# Patient Record
Sex: FEMALE | Race: ASIAN | NOT HISPANIC OR LATINO | URBAN - METROPOLITAN AREA
[De-identification: names, ages, dates, MRNs, and addresses within clinical notes are randomized per-mention and may not be internally consistent; named-entity substitution may affect disease eponyms.]

---

## 2021-10-08 ENCOUNTER — OFFICE VISIT (OUTPATIENT)
Dept: URBAN - METROPOLITAN AREA CLINIC 33 | Facility: CLINIC | Age: 46
End: 2021-10-08

## 2021-10-08 VITALS
HEIGHT: 63 IN | SYSTOLIC BLOOD PRESSURE: 104 MMHG | OXYGEN SATURATION: 97 % | WEIGHT: 110 LBS | HEART RATE: 64 BPM | BODY MASS INDEX: 19.49 KG/M2 | DIASTOLIC BLOOD PRESSURE: 64 MMHG

## 2021-10-08 PROBLEM — 267103008 GLOBUS SENSATION: Status: ACTIVE | Noted: 2021-10-08

## 2021-10-08 PROBLEM — 235595009 GASTROESOPHAGEAL REFLUX DISEASE: Status: ACTIVE | Noted: 2021-10-08

## 2021-10-08 RX ORDER — PANTOPRAZOLE SODIUM 40 MG/1
1 TABLET TABLET, DELAYED RELEASE ORAL ONCE A DAY
Qty: 30 | Status: ACTIVE | COMMUNITY

## 2021-10-08 RX ORDER — SODIUM, POTASSIUM,MAG SULFATES 17.5-3.13G
ML AS DIRECTED SOLUTION, RECONSTITUTED, ORAL ORAL
Qty: 1 KIT | Refills: 0 | OUTPATIENT
Start: 2021-10-08

## 2021-10-08 NOTE — HPI-MIGRATED HPI
;   ;     Dysphagia : 45 y/o female patient presents today for consultation about dysphagia. Onset was 4 months and episodes occur every day. Dysphagia occurs with solid foods. Patient admits having the sensation of food getting stuck in the throat or chest or behind the breastbone (sternum), but denies bringing food back up (regurgitation). Patient admits heartburn and sore throat, but denies associated symptoms such as odynophagia, vomiting, or fever.  Patient admits she has tried Nexium without relief of symptoms and is currently not taking this medication.   Patient admits to taking Protonix 40mg in the AM and Famotidine 20mg in the PM for relief of symptoms.  Patient denies past EGD, Pill Cam Study, or Barium Swallow Test. ;   Acid Reflux : 45 y/o female patient admits the recent onset of acid reflux. Onset was 4 months and episodes occur every day. The symptoms are described as burning sensation and is located in the epigastric region. Patient states that she has tried Nexium with no relief of symptoms and is not currently taking any medication. Patient was prescribed Protonix 40 MG by her PCP. Patient admits dysphagia and sour erucatations, but denies excessive belching, globus, bloating/gas, indigestion, early satiety, changes in appetite, coughing, abdominal/epigastric pain, or changes in bowel habits. Patient states that they have not had any recent changes in their medications. Patient states that she's been controlling her diet and avoiding caffeine, but did not see any alleviation in her symptoms.   Patient denies family hx of gastric/esophageal cancer or diseases. Patient denies past EGD. ;

## 2021-10-28 ENCOUNTER — OFFICE VISIT (OUTPATIENT)
Dept: URBAN - METROPOLITAN AREA SURGERY CENTER 8 | Facility: SURGERY CENTER | Age: 46
End: 2021-10-28

## 2021-11-11 ENCOUNTER — OFFICE VISIT (OUTPATIENT)
Dept: URBAN - METROPOLITAN AREA CLINIC 35 | Facility: CLINIC | Age: 46
End: 2021-11-11

## 2021-11-11 ENCOUNTER — DASHBOARD ENCOUNTERS (OUTPATIENT)
Age: 46
End: 2021-11-11

## 2021-11-11 PROBLEM — 4556007 GASTRITIS: Status: ACTIVE | Noted: 2021-11-11

## 2021-11-11 RX ORDER — PANTOPRAZOLE SODIUM 40 MG/1
1 TABLET TABLET, DELAYED RELEASE ORAL ONCE A DAY
Qty: 30 | Status: ACTIVE | COMMUNITY

## 2021-11-11 RX ORDER — SODIUM, POTASSIUM,MAG SULFATES 17.5-3.13G
ML AS DIRECTED SOLUTION, RECONSTITUTED, ORAL ORAL
Qty: 1 KIT | Refills: 0 | Status: ON HOLD | COMMUNITY
Start: 2021-10-08

## 2021-11-11 NOTE — HPI-MIGRATED HPI
;   ;   ;   ;     Follow up- EGD : 45 y/o female patient presents today for follow up to her EGD, which was completed on (10/28/2021) by Dr. Frank Duran. Patient enies any complications after her procedure. Since the procedure, the patient denies dysphagia, heartburn, globus, changes in appetite, abdominal/epigastric pain or changes in bowel habits.   EGD report shows: 1. Erythematous mucosa in the gastric body and antrum. Biopsied.  2. Erythematous mucosa in the esophagus. Biopsied.;   Dysphagia : Patient continues to experience intermittent dysphagia only with acid reflux. Patient admits heartburn and sore throat, but denies associated symptoms such as odynophagia, vomiting, or fever.  Patient continues to take Protonix 40mg.       Last visit (10/08/2021) 45 y/o female patient presents today for consultation about dysphagia. Onset was 4 months and episodes occur every day. Dysphagia occurs with solid foods. Patient admits having the sensation of food getting stuck in the throat or chest or behind the breastbone (sternum), but denies bringing food back up (regurgitation). Patient admits heartburn and sore throat, but denies associated symptoms such as odynophagia, vomiting, or fever.  Patient admits she has tried Nexium without relief of symptoms and is currently not taking this medication.   Patient admits to taking Protonix 40mg in the AM and Famotidine 20mg in the PM for relief of symptoms.  Patient denies past EGD, Pill Cam Study, or Barium Swallow Test.;   Colonoscopy Follow-Up : 45 y/o female patient presents today for follow up to her colonoscopy, which was completed on (10/28/2021) by Dr. Frank Duran. Patient denies any complications after her procedure. Patient currently admits 1 formed bowel movements per day. Patient denies any melena, blood or mucus in stools. Patient denies any associated abdominal pain, heartburn, bloating, or gas.   Colonoscopy report shows: One 5 mm polyp in the transverse colon, removed with a cold snare. Resected and retrieved.   ;   Acid Reflux : Patient experiences intermittent acid reflux symptoms.  Patient admits dysphagia and sour erucatations only when she has acid reflux. Patient denies excessive belching, globus, bloating/gas, indigestion, early satiety, changes in appetite, coughing, abdominal/epigastric pain, or changes in bowel habits.   Patient continues to take Protonix 40 MG. Patient has made significant changes to her diet, which has resolved most of her symptoms.      Last visit (10/08/2021) 45 y/o female patient admits the recent onset of acid reflux. Onset was 4 months and episodes occur every day. The symptoms are described as burning sensation and is located in the epigastric region. Patient states that she has tried Nexium with no relief of symptoms and is not currently taking any medication. Patient was prescribed Protonix 40 MG by her PCP. Patient admits dysphagia and sour erucatations, but denies excessive belching, globus, bloating/gas, indigestion, early satiety, changes in appetite, coughing, abdominal/epigastric pain, or changes in bowel habits. Patient states that they have not had any recent changes in their medications. Patient states that she's been controlling her diet and avoiding caffeine, but did not see any alleviation in her symptoms.   Patient denies family hx of gastric/esophageal cancer or diseases. Patient denies past EGD.;

## 2025-06-19 ENCOUNTER — CLAIMS CREATED FROM THE CLAIM WINDOW (OUTPATIENT)
Dept: URBAN - METROPOLITAN AREA SURGERY CENTER 8 | Facility: SURGERY CENTER | Age: 50
End: 2025-06-19
Payer: COMMERCIAL

## 2025-06-19 ENCOUNTER — CLAIMS CREATED FROM THE CLAIM WINDOW (OUTPATIENT)
Dept: URBAN - METROPOLITAN AREA CLINIC 4 | Facility: CLINIC | Age: 50
End: 2025-06-19
Payer: COMMERCIAL

## 2025-06-19 DIAGNOSIS — Z09 ENCOUNTER FOR FOLLOW-UP: ICD-10-CM

## 2025-06-19 DIAGNOSIS — K63.89 OTHER SPECIFIED DISEASES OF INTESTINE: ICD-10-CM

## 2025-06-19 DIAGNOSIS — Z86.0100 PERSONAL HISTORY OF COLONIC POLYPS: ICD-10-CM

## 2025-06-19 DIAGNOSIS — K63.5 POLYP OF COLON: ICD-10-CM

## 2025-06-19 DIAGNOSIS — Z86.0101 PERSONAL HISTORY OF ADENOMATOUS AND SERRATED COLON POLYPS: ICD-10-CM

## 2025-06-19 DIAGNOSIS — Z86.0100 PERSONAL HISTORY OF COLON POLYPS, UNSPECIFIED: ICD-10-CM

## 2025-06-19 DIAGNOSIS — Z09 ENCOUNTER FOR FOLLOW-UP EXAMINATION AFTER COMPLETED TREATMENT FOR CONDITIONS OTHER THAN MALIGNANT NEOPLASM: ICD-10-CM

## 2025-06-19 PROCEDURE — 00812 ANES LWR INTST SCR COLSC: CPT | Performed by: NURSE ANESTHETIST, CERTIFIED REGISTERED

## 2025-06-19 PROCEDURE — 88305 TISSUE EXAM BY PATHOLOGIST: CPT | Performed by: PATHOLOGY

## 2025-06-19 PROCEDURE — 45385 COLONOSCOPY W/LESION REMOVAL: CPT | Performed by: STUDENT IN AN ORGANIZED HEALTH CARE EDUCATION/TRAINING PROGRAM

## 2025-06-19 RX ORDER — SODIUM, POTASSIUM,MAG SULFATES 17.5-3.13G
ML AS DIRECTED SOLUTION, RECONSTITUTED, ORAL ORAL
Qty: 1 KIT | Refills: 0 | Status: ON HOLD | COMMUNITY
Start: 2021-10-08

## 2025-06-19 RX ORDER — PANTOPRAZOLE SODIUM 40 MG/1
1 TABLET TABLET, DELAYED RELEASE ORAL ONCE A DAY
Qty: 30 | Status: ACTIVE | COMMUNITY